# Patient Record
Sex: MALE | Race: WHITE | NOT HISPANIC OR LATINO | URBAN - METROPOLITAN AREA
[De-identification: names, ages, dates, MRNs, and addresses within clinical notes are randomized per-mention and may not be internally consistent; named-entity substitution may affect disease eponyms.]

---

## 2019-12-11 ENCOUNTER — OFFICE VISIT (OUTPATIENT)
Dept: URBAN - METROPOLITAN AREA CLINIC 14 | Facility: CLINIC | Age: 79
End: 2019-12-11
Payer: MEDICARE

## 2019-12-11 PROCEDURE — 92014 COMPRE OPH EXAM EST PT 1/>: CPT | Performed by: OPHTHALMOLOGY

## 2019-12-11 ASSESSMENT — INTRAOCULAR PRESSURE
OD: 14
OS: 15

## 2019-12-11 ASSESSMENT — VISUAL ACUITY: OD: 20/40

## 2019-12-11 NOTE — IMPRESSION/PLAN
Impression: Age-related nuclear cataract, left eye: H25.12. Plan: Pt notes VA is getting worse but not too bothersome yet, will monitor for changes.  Will have pt return in 6 months for Eval

## 2020-06-10 ENCOUNTER — OFFICE VISIT (OUTPATIENT)
Dept: URBAN - METROPOLITAN AREA CLINIC 14 | Facility: CLINIC | Age: 80
End: 2020-06-10
Payer: MEDICARE

## 2020-06-10 DIAGNOSIS — H25.12 AGE-RELATED NUCLEAR CATARACT, LEFT EYE: ICD-10-CM

## 2020-06-10 DIAGNOSIS — H33.8 OTHER RETINAL DETACHMENTS: Primary | ICD-10-CM

## 2020-06-10 PROCEDURE — 92014 COMPRE OPH EXAM EST PT 1/>: CPT | Performed by: OPHTHALMOLOGY

## 2020-06-10 NOTE — IMPRESSION/PLAN
Impression: Presbyopia: H52.4. Plan: CL in OS. Pt will bring in CL info. PT WEARS OS BIOFINITY TORIC   -4.25 -1.75 X 100.  bc 8.7  DIAM 14. 5

## 2020-07-29 NOTE — IMPRESSION/PLAN
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 S/p Repair Plan: S/p repair is stable on today's exam. monitor for any changes.  Recommend pt to RTC if any sudden VA changes/complication/loss

## 2021-07-07 ENCOUNTER — OFFICE VISIT (OUTPATIENT)
Dept: URBAN - METROPOLITAN AREA CLINIC 14 | Facility: CLINIC | Age: 81
End: 2021-07-07
Payer: MEDICARE

## 2021-07-07 DIAGNOSIS — H52.4 PRESBYOPIA: ICD-10-CM

## 2021-07-07 DIAGNOSIS — H25.812 COMBINED FORMS OF AGE-RELATED CATARACT, LEFT EYE: ICD-10-CM

## 2021-07-07 PROCEDURE — 92014 COMPRE OPH EXAM EST PT 1/>: CPT | Performed by: OPHTHALMOLOGY

## 2021-07-07 ASSESSMENT — VISUAL ACUITY: OD: 20/40

## 2021-07-07 ASSESSMENT — INTRAOCULAR PRESSURE
OD: 16
OS: 16

## 2021-07-07 NOTE — IMPRESSION/PLAN
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 S/p Repair Plan: S/P retinal detachment repair on the right eye. Retina flat on buckle. Stable in appearance. Monitor yearly. Patient to call us sooner, onset of new visual changes.